# Patient Record
Sex: FEMALE | Race: WHITE
[De-identification: names, ages, dates, MRNs, and addresses within clinical notes are randomized per-mention and may not be internally consistent; named-entity substitution may affect disease eponyms.]

---

## 2017-02-12 ENCOUNTER — HOSPITAL ENCOUNTER (EMERGENCY)
Dept: HOSPITAL 53 - M ED | Age: 42
Discharge: HOME | End: 2017-02-12
Payer: OTHER GOVERNMENT

## 2017-02-12 DIAGNOSIS — H92.02: ICD-10-CM

## 2017-02-12 DIAGNOSIS — J01.90: Primary | ICD-10-CM

## 2017-02-12 DIAGNOSIS — Z79.3: ICD-10-CM

## 2017-02-12 DIAGNOSIS — J00: ICD-10-CM

## 2017-02-12 DIAGNOSIS — Z79.899: ICD-10-CM

## 2017-02-12 DIAGNOSIS — I10: ICD-10-CM

## 2017-02-12 DIAGNOSIS — R05: ICD-10-CM

## 2017-02-12 NOTE — EDDOCDS
Nurse's Notes                                                                                     

[facility]                                                                         

Name: Marely Mary                                                                           

Age: 41 yrs                                                                                       

Sex: Female                                                                                       

: 1975                                                                                   

MRN: [id number]                                                                                     

Arrival Date: 2017                                                                          

Time: 22:14                                                                                       

Account#: [id number]                                                                              

Bed Triage 3                                                                                      

Private MD: Mariya [facility]                                                                          

Diagnosis: Acute frontal sinusitis;Cough                                                          

                                                                                                  

Presentation:                                                                                     

                                                                                             

22:23 Presenting complaint: Patient states: fever, chest congestion, cough on and off for 4   rs3 

      days. Adult Sepsis Screening: The patient does not have new or worsening altered            

      mentation. Patient's respiratory rate is less than 22. Systolic blood pressure is           

      greater than 100. Patient has a qSOFA score of 0- Negative Sepsis Screen.                   

      Suicide/Homicide risk assessment- the patient denies having any suicidal and/or             

      homicidal ideations and does not present with any other emotional, behavioral or mental     

      health complaints.  Status: Patient is not a  or              

      dependent. Transition of care: patient was not received from another setting of care.       

22:23 Acuity: KENYA Level 4                                                                     rs3 

22:23 Method Of Arrival: Walkin/Carried/Asstd                                                 rs3 

                                                                                                  

Triage Assessment:                                                                                

22:26 General: Appears in no apparent distress. Pain: Location: sore throat. HIV screening NA rs3 

      for this visit Offered previously. Respiratory: Onset: The symptoms/episode                 

      began/occurred gradually.                                                                   

                                                                                                  

OB/GYN:                                                                                           

22:26 LMP 2017                                                                           rs3 

                                                                                                  

Historical:                                                                                       

- Allergies: no known allergies;                                                                  

- Home Meds:                                                                                      

1. pseudoephedrine HCl 240 mg Oral Tb24 daily                                                   

2. Nasacort 55 mcg/actuation Nasal aero as needed                                               

3. lisinopril 5 mg Oral tab 1 tab once daily                                                    

4. cetirizine 10 mg oral chew 1 tab once daily                                                  

5. multivitamin Oral cap 1 tablet daily                                                         

6. sumatriptan succinate 3 mg/0.5 mL Sub-Q pnij as needed                                       

7. BCP once daily                                                                               

- PMHx: Hypertension;                                                                             

- PSHx: nerve block;                                                                              

- Social history: Smoking status: Patient states was never smoker of tobacco. No                  

barriers to communication noted, The patient speaks fluent English.                             

- Family history: Not pertinent.                                                                  

- : The pt / caregiver states he / she is not on anticoagulants. Home medication list             

is obtained from the patient.                                                                   

- Exposure Risk Screening:: None identified.                                                      

                                                                                                  

                                                                                                  

Screenin:16 Screening information is obtained from the patient. Fall risk: No risks identified.     jmb 

      Assistance ADL's: requires no assistance with activities of daily living. Abuse/DV          

      Screen: The patient / caregiver reports he/she is: not in a situation that causes fear,     

      pain or injury. Nutritional screening: No deficits noted. Advance Directives:               

      Currently, there is no health care proxy. There is no active DNR order. There is no         

      living will. There is no Power of . home support is adequate.                       

                                                                                                  

Assessment:                                                                                       

23:16 General: Patient instructed on discharge instructions. Patient asked if there were any  jmb 

      questions regarding discharge, patient stated no. Patient signed discharge                  

      instructions. Patient discharged in stable condition. . Cardiovascular: Capillary           

      refill < 3 seconds Heart tones S1 S2 present. Respiratory: Airway is patent Respiratory     

      effort is even, unlabored, Respiratory pattern is regular, symmetrical, Breath sounds       

      are diminished bilaterally.                                                                 

                                                                                                  

Vital Signs:                                                                                      

22:16  / 71; Pulse 90; Resp 18; Temp 97.3(O); Pulse Ox 99% on R/A; Weight 64.41 kg (R); ct3 

      Height 5 ft. 9 in. (175.26 cm) (R); Pain 8/10;                                              

22:16 Body Mass Index 20.97 (64.41 kg, 175.26 cm)                                             ct3 

                                                                                                  

Vitals:                                                                                           

22:16 Log In Time: 2017 at 22:13.                                                ct3 

                                                                                                  

ED Course:                                                                                        

22:15 Patient visited by Whitney Nguyen PCA.                                              ct3 

22:15 Patient moved to Waiting                                                                ct3 

22:16 Mariya [facility] is Private Physician.                                                      ct3 

22:17 Patient moved to Pre RCE                                                                ct3 

22:24 Triage Initiated                                                                        rs3 

22:40 Patient moved to Triage 3                                                               kc3 

22:54 Carmelo Collado PA is PHCP.                                                           mo1 

22:54 Garcia Calderon DO is Attending Physician.                                            mo1 

23:11 Patient visited by Carmelo Collado PA.                                                mo1 

23:12 SHREYAS Meraz is Referral Physician.                                                     mo1 

23:16 The patient / caregiver is instructed regarding the plan of care and ED course.         jmb 

23:16 No IV's were initiated during this patient's visit. No procedures done that require     jmb 

      assistance.                                                                                 

                                                                                                  

Administered Medications:                                                                         

23:16 Drug: Ventolin 2 puffs [Ventolin HFA 90 mcg/actuation aerosol inhaler (2 puffs)] Route: jmb 

      Inhalation;                                                                                 

23:16 Drug: Amoxicillin-Clavulanate 1 tabs [amoxicillin 875 mg-potassium clavulanate 125 mg   jmb 

      tablet (1 tabs)] Route: PO;                                                                 

                                                                                                  

                                                                                                  

Order Results:                                                                                    

There are currently no results for this order.                                                    

Outcome:                                                                                          

23:13 Discharge ordered by Provider.                                                          mo1 

23:16 Discharge Assessment: Patient awake, alert and oriented x 3. No cognitive and/or        jmb 

      functional deficits noted. Patient verbalized understanding of disposition                  

      instructions. Patient awake and alert. obeys commands, Oriented to person, place and        

      time. Patient verbalized understanding of disposition instructions. Patient has no          

      functional deficits. patient administered narcotics - no. The following High Risk           

      Discharge criteria are identified: None. Discharged to home ambulatory, with                

      significant other. Condition: stable Condition: improved. Discharge instructions given      

      to patient, Instructed on discharge instructions, follow up and referral plans.             

      medication usage, Demonstrated understanding of instructions, medications, Pt was           

      receptive of discharge instructions/ teaching. Prescriptions given X 1. No special          

      radiology studies were completed. Property sent home with patient.                          

23:18 Patient left the ED.                                                                    b 

                                                                                                  

Signatures:                                                                                       

Yuliet VillafanaRN                   RN   rs3                                                  

Whitney Nguyen, PCA                  PCA  ct3                                                  

Carmelo Collado PA                    PA   mo1                                                  

Chris Yarbrough RN RN jmb Crane, Kelsi, RN                          RN   kc3                                                  

                                                                                                  

Corrections: (The following items were deleted from the chart)                                    

22:27 22:25 PSHx: none; rs3                                                                   rs3 

                                                                                                  

**************************************************************************************************

MTDD

## 2017-02-12 NOTE — EDDOCDS
Physician Documentation                                                                           

[facility]                                                                         

Name: Marely Mary                                                                           

Age: 41 yrs                                                                                       

Sex: Female                                                                                       

: 1975                                                                                   

MRN: [id number]                                                                                     

Arrival Date: 2017                                                                          

Time: 22:14                                                                                       

Account#: [id number]                                                                              

Bed Triage 3                                                                                      

Private MD: SHREYAS Meraz                                                                          

Disposition:                                                                                      

17 23:13 Discharged to Home/Self Care. Impression: Acute frontal sinusitis, Cough.          

- Condition is Stable.                                                                            

- Discharge Instructions: Sinus Headache, Sinusitis, Adult.                                       

- Prescriptions for Augmentin 875- 125 mg Oral Tablet - take 1 tablet by ORAL route               

every 12 hours for 10 days; 20 tablet.                                                          

- Medication Reconciliation, Local Pharmacy Hours form.                                           

- Follow up: SHREYAS Meraz; When: Call to arrange an appointment; Reason: Recheck                  

today's complaints, Continuance of care.                                                        

- Problem is new.                                                                                 

- Symptoms are unchanged.                                                                         

                                                                                                  

                                                                                                  

                                                                                                  

Historical:                                                                                       

- Allergies: no known allergies;                                                                  

- Home Meds:                                                                                      

1. pseudoephedrine HCl 240 mg Oral Tb24 daily                                                   

2. Nasacort 55 mcg/actuation Nasal aero as needed                                               

3. lisinopril 5 mg Oral tab 1 tab once daily                                                    

4. cetirizine 10 mg oral chew 1 tab once daily                                                  

5. multivitamin Oral cap 1 tablet daily                                                         

6. sumatriptan succinate 3 mg/0.5 mL Sub-Q pnij as needed                                       

7. BCP once daily                                                                               

- PMHx: Hypertension;                                                                             

- PSHx: nerve block;                                                                              

- Social history: Smoking status: Patient states was never smoker of tobacco. No                  

barriers to communication noted, The patient speaks fluent English.                             

- Family history: Not pertinent.                                                                  

- : The pt / caregiver states he / she is not on anticoagulants. Home medication list             

is obtained from the patient.                                                                   

- Exposure Risk Screening:: None identified.                                                      

                                                                                                  

                                                                                                  

OB/GYN:                                                                                           

                                                                                             

22:26 LMP 2017                                                                           rs3 

                                                                                                  

Vital Signs:                                                                                      

22:16  / 71; Pulse 90; Resp 18; Temp 97.3(O); Pulse Ox 99% on R/A; Weight 64.41 kg /    ct3 

      142 lbs (R); Height 5 ft. 9 in. (175.26 cm) (R); Pain 8/10;                                 

22:16 Body Mass Index 20.97 (64.41 kg, 175.26 cm)                                             ct3 

                                                                                                  

MDM:                                                                                              

23:11 Ventolin Inhaler 2 puffs Inhalation once; 1-2 puffs every 4-6hrs ordered.               mo1 

23:11 Amoxicillin-Clavulanate 875 mg 1 tabs PO once ordered.                                  mo1 

                                                                                                  

Administered Medications:                                                                         

23:16 Drug: Ventolin 2 puffs [Ventolin HFA 90 mcg/actuation aerosol inhaler (2 puffs)] Route: jmb 

      Inhalation;                                                                                 

23:16 Drug: Amoxicillin-Clavulanate 1 tabs [amoxicillin 875 mg-potassium clavulanate 125 mg   jmb 

      tablet (1 tabs)] Route: PO;                                                                 

                                                                                                  

                                                                                                  

Signatures:                                                                                       

Yuliet VillafanaRN                   RN   rs3                                                  

Carmelo Collado PA                    PA   mo1                                                  

Chris Yarbrough RN                        RN   reubenb                                                  

                                                                                                  

The chart was reviewed and I authenticate all verbal orders and agree with the evaluation and 
treatment provided.Corrections: (The following items were deleted from the chart)

22:27 22:25 PSHx: none; rs3                                                                   rs3 

                                                                                                  

**************************************************************************************************

MTDD

## 2017-02-15 NOTE — EDDOCDS
Nurse's Notes                                                                                     

[facility]                                                                         

Name: Marely Mary                                                                           

Age: 41 yrs                                                                                       

Sex: Female                                                                                       

: 1975                                                                                   

MRN: [id number]                                                                                     

Arrival Date: 2017                                                                          

Time: 22:14                                                                                       

Account#: [id number]                                                                              

Bed Triage 3                                                                                      

[place] [facility]                                                                          

Diagnosis: Acute frontal sinusitis;Cough                                                          

                                                                                                  

Presentation:                                                                                     

                                                                                             

22:23 Presenting complaint: Patient states: fever, chest congestion, cough on and off for 4   rs3 

      days. Adult Sepsis Screening: The patient does not have new or worsening altered            

      mentation. Patient's respiratory rate is less than 22. Systolic blood pressure is           

      greater than 100. Patient has a qSOFA score of 0- Negative Sepsis Screen.                   

      Suicide/Homicide risk assessment- the patient denies having any suicidal and/or             

      homicidal ideations and does not present with any other emotional, behavioral or mental     

      health complaints.  Status: Patient is not a  or              

      dependent. Transition of care: patient was not received from another setting of care.       

22:23 Acuity: KENYA Level 4                                                                     rs3 

22:23 Method Of Arrival: Walkin/Carried/Asstd                                                 rs3 

                                                                                                  

Triage Assessment:                                                                                

22:26 General: Appears in no apparent distress. Pain: Location: sore throat. HIV screening NA rs3 

      for this visit Offered previously. Respiratory: Onset: The symptoms/episode                 

      began/occurred gradually.                                                                   

                                                                                                  

OB/GYN:                                                                                           

22:26 LMP 2017                                                                           rs3 

                                                                                                  

Historical:                                                                                       

- Allergies: no known allergies;                                                                  

- Home Meds:                                                                                      

1. pseudoephedrine HCl 240 mg Oral Tb24 daily                                                   

2. Nasacort 55 mcg/actuation Nasal aero as needed                                               

3. lisinopril 5 mg Oral tab 1 tab once daily                                                    

4. cetirizine 10 mg oral chew 1 tab once daily                                                  

5. multivitamin Oral cap 1 tablet daily                                                         

6. sumatriptan succinate 3 mg/0.5 mL Sub-Q pnij as needed                                       

7. BCP once daily                                                                               

- PMHx: Hypertension;                                                                             

- PSHx: nerve block;                                                                              

- Social history: Smoking status: Patient states was never smoker of tobacco. No                  

barriers to communication noted, The patient speaks fluent English.                             

- Family history: Not pertinent.                                                                  

- : The pt / caregiver states he / she is not on anticoagulants. Home medication list             

is obtained from the patient.                                                                   

- Exposure Risk Screening:: None identified.                                                      

                                                                                                  

                                                                                                  

Screenin:16 Screening information is obtained from the patient. Fall risk: No risks identified.     jmb 

      Assistance ADL's: requires no assistance with activities of daily living. Abuse/DV          

      Screen: The patient / caregiver reports he/she is: not in a situation that causes fear,     

      pain or injury. Nutritional screening: No deficits noted. Advance Directives:               

      Currently, there is no health care proxy. There is no active DNR order. There is no         

      living will. There is no Power of . home support is adequate.                       

                                                                                                  

Assessment:                                                                                       

23:16 General: Patient instructed on discharge instructions. Patient asked if there were any  jmb 

      questions regarding discharge, patient stated no. Patient signed discharge                  

      instructions. Patient discharged in stable condition. . Cardiovascular: Capillary           

      refill < 3 seconds Heart tones S1 S2 present. Respiratory: Airway is patent Respiratory     

      effort is even, unlabored, Respiratory pattern is regular, symmetrical, Breath sounds       

      are diminished bilaterally.                                                                 

                                                                                                  

Vital Signs:                                                                                      

22:16  / 71; Pulse 90; Resp 18; Temp 97.3(O); Pulse Ox 99% on R/A; Weight 64.41 kg (R); ct3 

      Height 5 ft. 9 in. (175.26 cm) (R); Pain 8/10;                                              

22:16 Body Mass Index 20.97 (64.41 kg, 175.26 cm)                                             ct3 

                                                                                                  

Vitals:                                                                                           

22:16 Log In Time: 2017 at 22:13.                                                ct3 

                                                                                                  

ED Course:                                                                                        

22:15 Patient visited by Whitney Nguyen PCA.                                              ct3 

22:15 Patient moved to Waiting                                                                ct3 

22:16 [place] [facility] [place].                                                      ct3 

22:17 Patient moved to Pre RCE                                                                ct3 

22:24 Triage Initiated                                                                        rs3 

22:40 Patient moved to Triage 3                                                               kc3 

22:54 Carmelo Collado PA is PHCP.                                                           mo1 

22:54 Garcia Calderon DO is Attending Physician.                                            mo1 

23:11 Patient visited by Carmelo Collado PA.                                                mo1 

23:12 SHREYAS Meraz is Referral Physician.                                                     mo1 

23:16 The patient / caregiver is instructed regarding the plan of care and ED course.         jmb 

23:16 No IV's were initiated during this patient's visit. No procedures done that require     jmb 

      assistance.                                                                                 

23:42 [facility] Payment Agreement was scanned into [employer] and attached to record.               jp5 

                                                                                             

13:30 T-Sheet-- Draft Copy was scanned into [employer] and attached to record.                   gb  

                                                                                                  

Administered Medications:                                                                         

                                                                                             

23:16 Drug: Ventolin 2 puffs [Ventolin HFA 90 mcg/actuation aerosol inhaler (2 puffs)] Route: jmb 

      Inhalation;                                                                                 

23:16 Drug: Amoxicillin-Clavulanate 1 tabs [amoxicillin 875 mg-potassium clavulanate 125 mg   jmb 

      tablet (1 tabs)] Route: PO;                                                                 

                                                                                                  

                                                                                                  

Order Results:                                                                                    

There are currently no results for this order.                                                    

Outcome:                                                                                          

23:13 Discharge ordered by Provider.                                                          mo1 

23:16 Discharge Assessment: Patient awake, alert and oriented x 3. No cognitive and/or        jmb 

      functional deficits noted. Patient verbalized understanding of disposition                  

      instructions. Patient awake and alert. obeys commands, Oriented to person, place and        

      time. Patient verbalized understanding of disposition instructions. Patient has no          

      functional deficits. patient administered narcotics - no. The following High Risk           

      Discharge criteria are identified: None. Discharged to home ambulatory, with                

      significant other. Condition: stable Condition: improved. Discharge instructions given      

      to patient, Instructed on discharge instructions, follow up and referral plans.             

      medication usage, Demonstrated understanding of instructions, medications, Pt was           

      receptive of discharge instructions/ teaching. Prescriptions given X 1. No special          

      radiology studies were completed. Property sent home with patient.                          

23:18 Patient left the ED.                                                                    b 

                                                                                                  

Signatures:                                                                                       

Erin Muhammad, Reg                  Reg  gb                                                   

Yuliet Villafana,RN                   RN   rs3                                                  

Whitney Nguyen, PCA                  PCA  ct3                                                  

Carmelo Collado PA                    PA   mo1                                                  

Chris Yarbrough,RN                        RN   Марина Simmons                              jp5                                                  

Mable Smiley,RN                          RN   kc3                                                  

                                                                                                  

Corrections: (The following items were deleted from the chart)                                    

22:27 22:25 PSHx: none; rs3                                                                   rs3 

                                                                                                  

**************************************************************************************************



*** Chart Complete ***
MTDD

## 2017-02-15 NOTE — EDDOCDS
Physician Documentation                                                                           

Health system                                                                         

Name: Marely Mary                                                                           

Age: 41 yrs                                                                                       

Sex: Female                                                                                       

: 1975                                                                                   

MRN: [id number]                                                                                     

Arrival Date: 2017                                                                          

Time: 22:14                                                                                       

Account#: [id number]                                                                              

Bed Triage 3                                                                                      

Private MD: SHREYAS Meraz                                                                          

Disposition:                                                                                      

17 23:13 Discharged to Home/Self Care. Impression: Acute frontal sinusitis, Cough.          

- Condition is Stable.                                                                            

- Discharge Instructions: Sinus Headache, Sinusitis, Adult.                                       

- Prescriptions for Augmentin 875- 125 mg Oral Tablet - take 1 tablet by ORAL route               

every 12 hours for 10 days; 20 tablet.                                                          

- Medication Reconciliation, Local Pharmacy Hours form.                                           

- Follow up: SHREYAS Meraz; When: Call to arrange an appointment; Reason: Recheck                  

today's complaints, Continuance of care.                                                        

- Problem is new.                                                                                 

- Symptoms are unchanged.                                                                         

                                                                                                  

                                                                                                  

                                                                                                  

Historical:                                                                                       

- Allergies: no known allergies;                                                                  

- Home Meds:                                                                                      

1. pseudoephedrine HCl 240 mg Oral Tb24 daily                                                   

2. Nasacort 55 mcg/actuation Nasal aero as needed                                               

3. lisinopril 5 mg Oral tab 1 tab once daily                                                    

4. cetirizine 10 mg oral chew 1 tab once daily                                                  

5. multivitamin Oral cap 1 tablet daily                                                         

6. sumatriptan succinate 3 mg/0.5 mL Sub-Q pnij as needed                                       

7. BCP once daily                                                                               

- PMHx: Hypertension;                                                                             

- PSHx: nerve block;                                                                              

- Social history: Smoking status: Patient states was never smoker of tobacco. No                  

barriers to communication noted, The patient speaks fluent English.                             

- Family history: Not pertinent.                                                                  

- : The pt / caregiver states he / she is not on anticoagulants. Home medication list             

is obtained from the patient.                                                                   

- Exposure Risk Screening:: None identified.                                                      

                                                                                                  

                                                                                                  

OB/GYN:                                                                                           

                                                                                             

22:26 LMP 2017                                                                           rs3 

                                                                                                  

Vital Signs:                                                                                      

22:16  / 71; Pulse 90; Resp 18; Temp 97.3(O); Pulse Ox 99% on R/A; Weight 64.41 kg /    ct3 

      142 lbs (R); Height 5 ft. 9 in. (175.26 cm) (R); Pain 8/10;                                 

22:16 Body Mass Index 20.97 (64.41 kg, 175.26 cm)                                             ct3 

                                                                                                  

MDM:                                                                                              

23:11 Ventolin Inhaler 2 puffs Inhalation once; 1-2 puffs every 4-6hrs ordered.               mo1 

23:11 Amoxicillin-Clavulanate 875 mg 1 tabs PO once ordered.                                  mo1 

23:42 [facility] Payment Agreement was scanned into [email or web address] and attached to record.               jp5 

23:42 Financial registration complete.                                                        jp5 

                                                                                             

13:30 T-Sheet-- Draft Copy was scanned into [email or web address] and attached to record.                   gb  

                                                                                                  

Administered Medications:                                                                         

                                                                                             

23:16 Drug: Ventolin 2 puffs [Ventolin HFA 90 mcg/actuation aerosol inhaler (2 puffs)] Route: jmb 

      Inhalation;                                                                                 

23:16 Drug: Amoxicillin-Clavulanate 1 tabs [amoxicillin 875 mg-potassium clavulanate 125 mg   jmb 

      tablet (1 tabs)] Route: PO;                                                                 

                                                                                                  

                                                                                                  

Signatures:                                                                                       

Erin Muhammad, Reg                  Reg  gb                                                   

Yuliet Villafana RN                   RN   rs3                                                  

Carmelo Collado PA PA   mo1                                                  

Chris Yarbrough RN RN jmb Price, Jennalee                              5                                                  

                                                                                                  

The chart was reviewed and I authenticate all verbal orders and agree with the evaluation and 
treatment provided.Corrections: (The following items were deleted from the chart)

22:27 22:25 PSHx: none; rs3                                                                   rs3 

                                                                                                  

Attachments:                                                                                      

23:42 [facility] Payment Agreement                                                                jp5 

                                                                                             

13:30 T-Sheet-- Draft Copy                                                                    gb  

                                                                                                  

**************************************************************************************************



*** Chart Complete ***
MTDD

## 2017-02-15 NOTE — EDDOCDS
Physician Documentation                                                                           

[facility]                                                                         

Name: Marely Mary                                                                           

Age: 41 yrs                                                                                       

Sex: Female                                                                                       

: 1975                                                                                   

MRN: [id number]                                                                                     

Arrival Date: 2017                                                                          

Time: 22:14                                                                                       

Account#: [id number]                                                                              

Bed Triage 3                                                                                      

Private MD: SHREYAS Meraz                                                                          

Disposition:                                                                                      

17 23:13 Discharged to Home/Self Care. Impression: Acute frontal sinusitis, Cough.          

- Condition is Stable.                                                                            

- Discharge Instructions: Sinus Headache, Sinusitis, Adult.                                       

- Prescriptions for Augmentin 875- 125 mg Oral Tablet - take 1 tablet by ORAL route               

every 12 hours for 10 days; 20 tablet.                                                          

- Medication Reconciliation, Local Pharmacy Hours form.                                           

- Follow up: SHREYAS Meraz; When: Call to arrange an appointment; Reason: Recheck                  

today's complaints, Continuance of care.                                                        

- Problem is new.                                                                                 

- Symptoms are unchanged.                                                                         

                                                                                                  

                                                                                                  

                                                                                                  

Historical:                                                                                       

- Allergies: no known allergies;                                                                  

- Home Meds:                                                                                      

1. pseudoephedrine HCl 240 mg Oral Tb24 daily                                                   

2. Nasacort 55 mcg/actuation Nasal aero as needed                                               

3. lisinopril 5 mg Oral tab 1 tab once daily                                                    

4. cetirizine 10 mg oral chew 1 tab once daily                                                  

5. multivitamin Oral cap 1 tablet daily                                                         

6. sumatriptan succinate 3 mg/0.5 mL Sub-Q pnij as needed                                       

7. BCP once daily                                                                               

- PMHx: Hypertension;                                                                             

- PSHx: nerve block;                                                                              

- Social history: Smoking status: Patient states was never smoker of tobacco. No                  

barriers to communication noted, The patient speaks fluent English.                             

- Family history: Not pertinent.                                                                  

- : The pt / caregiver states he / she is not on anticoagulants. Home medication list             

is obtained from the patient.                                                                   

- Exposure Risk Screening:: None identified.                                                      

                                                                                                  

                                                                                                  

OB/GYN:                                                                                           

                                                                                             

22:26 LMP 2017                                                                           rs3 

                                                                                                  

Vital Signs:                                                                                      

22:16  / 71; Pulse 90; Resp 18; Temp 97.3(O); Pulse Ox 99% on R/A; Weight 64.41 kg /    ct3 

      142 lbs (R); Height 5 ft. 9 in. (175.26 cm) (R); Pain 8/10;                                 

22:16 Body Mass Index 20.97 (64.41 kg, 175.26 cm)                                             ct3 

                                                                                                  

MDM:                                                                                              

23:11 Ventolin Inhaler 2 puffs Inhalation once; 1-2 puffs every 4-6hrs ordered.               mo1 

23:11 Amoxicillin-Clavulanate 875 mg 1 tabs PO once ordered.                                  mo1 

23:42 [facility] Payment Agreement was scanned into [employer] and attached to record.               jp5 

23:42 Financial registration complete.                                                        jp5 

                                                                                             

13:30 T-Sheet-- Draft Copy was scanned into [employer] and attached to record.                   gb  

                                                                                                  

Administered Medications:                                                                         

                                                                                             

23:16 Drug: Ventolin 2 puffs [Ventolin HFA 90 mcg/actuation aerosol inhaler (2 puffs)] Route: jmb 

      Inhalation;                                                                                 

23:16 Drug: Amoxicillin-Clavulanate 1 tabs [amoxicillin 875 mg-potassium clavulanate 125 mg   jmb 

      tablet (1 tabs)] Route: PO;                                                                 

                                                                                                  

                                                                                                  

Signatures:                                                                                       

Erin Muhammad, Reg                  Reg  gb                                                   

Yuliet Villafana RN                   RN   rs3                                                  

Carmelo Collado PA PA   mo1                                                  

Chris Yarbrough RN RN jmb Price, Jennalee                              5                                                  

                                                                                                  

The chart was reviewed and I authenticate all verbal orders and agree with the evaluation and 
treatment provided.Corrections: (The following items were deleted from the chart)

22:27 22:25 PSHx: none; rs3                                                                   rs3 

                                                                                                  

Attachments:                                                                                      

23:42 [facility] Payment Agreement                                                                jp5 

                                                                                             

13:30 T-Sheet-- Draft Copy                                                                    gb  

                                                                                                  

**************************************************************************************************



*** Chart Complete ***
MTDD

## 2018-07-23 ENCOUNTER — HOSPITAL ENCOUNTER (OUTPATIENT)
Dept: HOSPITAL 53 - M LRY | Age: 43
End: 2018-07-23
Attending: NURSE PRACTITIONER
Payer: COMMERCIAL
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
## 2018-07-23 DIAGNOSIS — S69.92XA: Primary | ICD-10-CM
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
## 2018-07-23 PROCEDURE — 90715 TDAP VACCINE 7 YRS/> IM: CPT

## 2018-07-23 PROCEDURE — 73130 X-RAY EXAM OF HAND: CPT

## 2019-04-24 ENCOUNTER — HOSPITAL ENCOUNTER (OUTPATIENT)
Dept: HOSPITAL 53 - M SFHCLERA | Age: 44
End: 2019-04-24
Attending: NURSE PRACTITIONER
Payer: COMMERCIAL

## 2019-04-24 DIAGNOSIS — J02.9: Primary | ICD-10-CM

## 2020-08-17 ENCOUNTER — HOSPITAL ENCOUNTER (OUTPATIENT)
Dept: HOSPITAL 53 - M RAD | Age: 45
End: 2020-08-17
Attending: GENERAL PRACTICE
Payer: COMMERCIAL

## 2020-08-17 DIAGNOSIS — N93.9: Primary | ICD-10-CM

## 2020-08-17 PROCEDURE — 72197 MRI PELVIS W/O & W/DYE: CPT
